# Patient Record
Sex: MALE | Race: BLACK OR AFRICAN AMERICAN | NOT HISPANIC OR LATINO | Employment: FULL TIME | URBAN - METROPOLITAN AREA
[De-identification: names, ages, dates, MRNs, and addresses within clinical notes are randomized per-mention and may not be internally consistent; named-entity substitution may affect disease eponyms.]

---

## 2023-04-11 PROBLEM — M79.671 FOOT PAIN, RIGHT: Status: ACTIVE | Noted: 2023-04-11

## 2023-04-11 PROBLEM — F41.9 ANXIETY DISORDER: Status: ACTIVE | Noted: 2023-04-11

## 2023-04-11 PROBLEM — E78.5 DYSLIPIDEMIA: Status: ACTIVE | Noted: 2023-04-11

## 2023-04-11 PROBLEM — E66.01 SEVERE OBESITY (BMI 35.0-39.9) WITH COMORBIDITY (MULTI): Status: ACTIVE | Noted: 2023-04-11

## 2023-04-11 PROBLEM — R43.0 ANOSMIA: Status: ACTIVE | Noted: 2023-04-11

## 2023-04-11 PROBLEM — Z98.52 HISTORY OF VASECTOMY: Status: ACTIVE | Noted: 2023-04-11

## 2023-04-11 PROBLEM — R50.9 FEVER AND CHILLS: Status: ACTIVE | Noted: 2023-04-11

## 2023-04-11 PROBLEM — K64.9 HEMORRHOIDS: Status: ACTIVE | Noted: 2023-04-11

## 2023-04-11 PROBLEM — G56.03 BILATERAL CARPAL TUNNEL SYNDROME: Status: ACTIVE | Noted: 2023-04-11

## 2023-04-11 PROBLEM — J45.909 ASTHMA (HHS-HCC): Status: ACTIVE | Noted: 2023-04-11

## 2023-04-11 PROBLEM — M79.10 MYALGIA: Status: ACTIVE | Noted: 2023-04-11

## 2023-04-11 PROBLEM — R42 DIZZINESS: Status: ACTIVE | Noted: 2023-04-11

## 2023-04-11 PROBLEM — R25.2 MUSCLE CRAMP: Status: ACTIVE | Noted: 2023-04-11

## 2023-04-11 PROBLEM — E66.9 OBESITY: Status: ACTIVE | Noted: 2023-04-11

## 2023-04-11 PROBLEM — I10 HYPERTENSION: Status: ACTIVE | Noted: 2023-04-11

## 2023-04-11 PROBLEM — M79.646 THUMB PAIN: Status: ACTIVE | Noted: 2023-04-11

## 2023-04-11 PROBLEM — R63.5 WEIGHT GAIN: Status: ACTIVE | Noted: 2023-04-11

## 2023-04-11 PROBLEM — E11.9 DIABETES MELLITUS (MULTI): Status: ACTIVE | Noted: 2023-04-11

## 2023-04-11 PROBLEM — J11.1 INFLUENZA: Status: ACTIVE | Noted: 2023-04-11

## 2023-04-11 PROBLEM — N52.9 DIFFICULTY ATTAINING ERECTION: Status: ACTIVE | Noted: 2023-04-11

## 2023-04-11 PROBLEM — G43.109 CLASSIC MIGRAINE WITH AURA: Status: ACTIVE | Noted: 2023-04-11

## 2023-04-11 PROBLEM — H43.399 FLOATERS: Status: ACTIVE | Noted: 2023-04-11

## 2023-04-11 PROBLEM — A60.00 HERPES GENITALIA: Status: ACTIVE | Noted: 2023-04-11

## 2023-04-11 PROBLEM — M25.511 SHOULDER PAIN, BILATERAL: Status: ACTIVE | Noted: 2023-04-11

## 2023-04-11 PROBLEM — M25.532 LEFT WRIST PAIN: Status: ACTIVE | Noted: 2023-04-11

## 2023-04-11 PROBLEM — M25.539 WRIST PAIN: Status: ACTIVE | Noted: 2023-04-11

## 2023-04-11 PROBLEM — R06.83 SNORING: Status: ACTIVE | Noted: 2023-04-11

## 2023-04-11 PROBLEM — M25.512 SHOULDER PAIN, BILATERAL: Status: ACTIVE | Noted: 2023-04-11

## 2023-04-11 PROBLEM — J30.9 ALLERGIC RHINITIS: Status: ACTIVE | Noted: 2023-04-11

## 2023-04-11 PROBLEM — E55.9 VITAMIN D DEFICIENCY: Status: ACTIVE | Noted: 2023-04-11

## 2023-04-11 PROBLEM — M54.12 CERVICAL RADICULOPATHY: Status: ACTIVE | Noted: 2023-04-11

## 2023-04-11 RX ORDER — ALBUTEROL SULFATE 90 UG/1
2 AEROSOL, METERED RESPIRATORY (INHALATION) EVERY 4 HOURS PRN
COMMUNITY

## 2023-04-11 RX ORDER — SERTRALINE HYDROCHLORIDE 50 MG/1
1 TABLET, FILM COATED ORAL DAILY
COMMUNITY

## 2023-04-11 RX ORDER — ASPIRIN 325 MG
1 TABLET, DELAYED RELEASE (ENTERIC COATED) ORAL
COMMUNITY

## 2023-04-11 RX ORDER — PREDNISONE 10 MG/1
3 TABLET ORAL DAILY
COMMUNITY

## 2023-04-11 RX ORDER — METFORMIN HYDROCHLORIDE 500 MG/1
2 TABLET, EXTENDED RELEASE ORAL 2 TIMES DAILY
COMMUNITY
End: 2023-04-14 | Stop reason: SDUPTHER

## 2023-04-11 RX ORDER — BLOOD-GLUCOSE METER
KIT MISCELLANEOUS DAILY
COMMUNITY

## 2023-04-14 ENCOUNTER — OFFICE VISIT (OUTPATIENT)
Dept: PRIMARY CARE | Facility: CLINIC | Age: 38
End: 2023-04-14
Payer: COMMERCIAL

## 2023-04-14 ENCOUNTER — LAB (OUTPATIENT)
Dept: LAB | Facility: LAB | Age: 38
End: 2023-04-14
Payer: COMMERCIAL

## 2023-04-14 VITALS
SYSTOLIC BLOOD PRESSURE: 120 MMHG | DIASTOLIC BLOOD PRESSURE: 80 MMHG | HEART RATE: 70 BPM | HEIGHT: 70 IN | OXYGEN SATURATION: 97 % | BODY MASS INDEX: 36.16 KG/M2 | WEIGHT: 252.6 LBS

## 2023-04-14 DIAGNOSIS — R74.01 ELEVATED TRANSAMINASE LEVEL: Primary | ICD-10-CM

## 2023-04-14 DIAGNOSIS — E78.5 HYPERLIPIDEMIA, UNSPECIFIED HYPERLIPIDEMIA TYPE: ICD-10-CM

## 2023-04-14 DIAGNOSIS — K29.70 GASTRITIS WITHOUT BLEEDING, UNSPECIFIED CHRONICITY, UNSPECIFIED GASTRITIS TYPE: ICD-10-CM

## 2023-04-14 DIAGNOSIS — I10 HYPERTENSION, UNSPECIFIED TYPE: ICD-10-CM

## 2023-04-14 DIAGNOSIS — E66.01 SEVERE OBESITY (BMI 35.0-39.9) WITH COMORBIDITY (MULTI): ICD-10-CM

## 2023-04-14 DIAGNOSIS — E11.9 TYPE 2 DIABETES MELLITUS WITHOUT COMPLICATION, WITHOUT LONG-TERM CURRENT USE OF INSULIN (MULTI): ICD-10-CM

## 2023-04-14 DIAGNOSIS — E11.9 TYPE 2 DIABETES MELLITUS WITHOUT COMPLICATION, WITHOUT LONG-TERM CURRENT USE OF INSULIN (MULTI): Primary | ICD-10-CM

## 2023-04-14 DIAGNOSIS — E66.01 CLASS 2 SEVERE OBESITY DUE TO EXCESS CALORIES WITH SERIOUS COMORBIDITY AND BODY MASS INDEX (BMI) OF 35.0 TO 35.9 IN ADULT (MULTI): ICD-10-CM

## 2023-04-14 LAB
ESTIMATED AVERAGE GLUCOSE FOR HBA1C: 249 MG/DL
HEMOGLOBIN A1C/HEMOGLOBIN TOTAL IN BLOOD: 10.3 %

## 2023-04-14 PROCEDURE — 83013 H PYLORI (C-13) BREATH: CPT

## 2023-04-14 PROCEDURE — 3079F DIAST BP 80-89 MM HG: CPT | Performed by: INTERNAL MEDICINE

## 2023-04-14 PROCEDURE — 83036 HEMOGLOBIN GLYCOSYLATED A1C: CPT

## 2023-04-14 PROCEDURE — 80061 LIPID PANEL: CPT

## 2023-04-14 PROCEDURE — 1036F TOBACCO NON-USER: CPT | Performed by: INTERNAL MEDICINE

## 2023-04-14 PROCEDURE — 82570 ASSAY OF URINE CREATININE: CPT

## 2023-04-14 PROCEDURE — 3074F SYST BP LT 130 MM HG: CPT | Performed by: INTERNAL MEDICINE

## 2023-04-14 PROCEDURE — 4010F ACE/ARB THERAPY RXD/TAKEN: CPT | Performed by: INTERNAL MEDICINE

## 2023-04-14 PROCEDURE — 82043 UR ALBUMIN QUANTITATIVE: CPT

## 2023-04-14 PROCEDURE — 3008F BODY MASS INDEX DOCD: CPT | Performed by: INTERNAL MEDICINE

## 2023-04-14 PROCEDURE — 99214 OFFICE O/P EST MOD 30 MIN: CPT | Performed by: INTERNAL MEDICINE

## 2023-04-14 PROCEDURE — 80053 COMPREHEN METABOLIC PANEL: CPT

## 2023-04-14 PROCEDURE — 36415 COLL VENOUS BLD VENIPUNCTURE: CPT

## 2023-04-14 RX ORDER — OMEPRAZOLE 20 MG/1
20 TABLET, DELAYED RELEASE ORAL DAILY
Qty: 30 TABLET | Refills: 1 | COMMUNITY
Start: 2023-04-14 | End: 2023-04-17 | Stop reason: SDUPTHER

## 2023-04-14 RX ORDER — LISINOPRIL 5 MG/1
5 TABLET ORAL DAILY
Qty: 90 TABLET | Refills: 2 | Status: SHIPPED | OUTPATIENT
Start: 2023-04-14 | End: 2024-01-16

## 2023-04-14 RX ORDER — METFORMIN HYDROCHLORIDE 500 MG/1
1000 TABLET, EXTENDED RELEASE ORAL 2 TIMES DAILY
Qty: 360 TABLET | Refills: 2 | Status: SHIPPED | OUTPATIENT
Start: 2023-04-14 | End: 2024-01-16

## 2023-04-14 ASSESSMENT — ENCOUNTER SYMPTOMS
FATIGUE: 0
POLYPHAGIA: 0
WEIGHT LOSS: 0
ABDOMINAL PAIN: 1
VISUAL CHANGE: 0
DIARRHEA: 0
BLURRED VISION: 0
POLYDIPSIA: 0
ABDOMINAL DISTENTION: 1
BLOOD IN STOOL: 0
NAUSEA: 1
WEAKNESS: 0
CONSTIPATION: 0
VOMITING: 0

## 2023-04-14 ASSESSMENT — PATIENT HEALTH QUESTIONNAIRE - PHQ9
1. LITTLE INTEREST OR PLEASURE IN DOING THINGS: NOT AT ALL
2. FEELING DOWN, DEPRESSED OR HOPELESS: NOT AT ALL
SUM OF ALL RESPONSES TO PHQ9 QUESTIONS 1 AND 2: 0

## 2023-04-14 NOTE — PROGRESS NOTES
"Patient ID: Chas Coello is a 37 y.o. male who presents for Annual Exam, Med Refill, and GI Problem.    /80   Pulse 70   Ht 1.784 m (5' 10.25\")   Wt 115 kg (252 lb 9.6 oz)   SpO2 97%   BMI 35.99 kg/m²     Med Refill  Associated symptoms include abdominal pain and nausea. Pertinent negatives include no chest pain, fatigue, visual change, vomiting or weakness.   GI Problem  The primary symptoms include abdominal pain and nausea. Primary symptoms do not include weight loss, fatigue, vomiting or diarrhea.   The illness does not include constipation.   Diabetes  He presents for his initial diabetic visit. He has type 2 diabetes mellitus. No MedicAlert identification noted. Onset time: few yrs. There are no hypoglycemic associated symptoms. Pertinent negatives for diabetes include no blurred vision, no chest pain, no fatigue, no foot paresthesias, no foot ulcerations, no polydipsia, no polyphagia, no polyuria, no visual change, no weakness and no weight loss. There are no hypoglycemic complications. Symptoms are stable. There are no diabetic complications. Risk factors for coronary artery disease include male sex, stress and obesity. Current diabetic treatment includes oral agent (monotherapy). He is compliant with treatment some of the time. His weight is increasing steadily. He is following a generally unhealthy diet. When asked about meal planning, he reported none. He has not had a previous visit with a dietitian. He never participates in exercise. An ACE inhibitor/angiotensin II receptor blocker is not being taken. He does not see a podiatrist.Eye exam is current.     His other significant symptoms which she is here for  For the last few weeks or maybe a month or so he is having bloated feeling mid upper abdomen  He is not constipated he often times get little bit of heartburn  For that he takes Tums which helps temporarily  He does not have any blood in the stool  He does not have any problem with " swallowing  He drinks beer he takes significant amount of caffeine      Patient has hypertension not on lisinopril  Or any other diuretics  He does not have any chest pain, no shortness of breath,  No PND, no orthopnea no leg swelling no headache no palpitation      He is morbidly obese  He does not have any apnea  He does not have any gasping or choking for air at night  No daytime somnolence  No a.m. fatigue  No a.m. headache  Sleep reasonably well    Subjective     Review of Systems   Constitutional:  Negative for fatigue and weight loss.   Eyes:  Negative for blurred vision.   Cardiovascular:  Negative for chest pain.   Gastrointestinal:  Positive for abdominal distention, abdominal pain and nausea. Negative for blood in stool, constipation, diarrhea and vomiting.   Endocrine: Negative for polydipsia, polyphagia and polyuria.   Neurological:  Negative for weakness.       Objective     Physical Exam  Vitals and nursing note reviewed.   Constitutional:       Appearance: Normal appearance. He is obese.   Neck:      Vascular: No carotid bruit.   Cardiovascular:      Rate and Rhythm: Normal rate and regular rhythm.      Pulses: Normal pulses.      Heart sounds: Normal heart sounds.   Pulmonary:      Effort: Pulmonary effort is normal.      Breath sounds: Normal breath sounds.   Abdominal:      General: There is no distension.      Palpations: There is no mass.      Tenderness: There is abdominal tenderness. There is guarding. There is no rebound.      Hernia: No hernia is present.      Comments: Epigastric tenderness on deep palpation   Neurological:      Mental Status: He is alert.         Lab Results   Component Value Date    WBC 6.9 06/17/2022    HGB 14.7 06/17/2022    HCT 43.7 06/17/2022    MCV 80 06/17/2022     06/17/2022           Problem List Items Addressed This Visit          Circulatory    Hypertension    Relevant Medications    lisinopril 5 mg tablet       Endocrine/Metabolic    Diabetes mellitus  (CMS/[facility])    Relevant Medications    metFORMIN XR (Glucophage-XR) 500 mg 24 hr tablet    lisinopril 5 mg tablet    Other Relevant Orders    Comprehensive metabolic panel    Hemoglobin A1c    Albumin , Urine Random    Severe obesity (BMI 35.0-39.9) with comorbidity (CMS/[facility]) - Primary     Other Visit Diagnoses       Hyperlipidemia, unspecified hyperlipidemia type        Relevant Orders    Lipid panel    Gastritis without bleeding, unspecified chronicity, unspecified gastritis type        Relevant Medications    omeprazole OTC (PriLOSEC OTC) 20 mg EC tablet    Other Relevant Orders    H. Pylori Breath Test                 A/P       Discussed with the patient at length  That he is most likely having hyperacidity/gastritis  From using NSAIDs/too much of caffeine/stress/dietary indiscretion  I told him he needs to cut back caffeine  He needs to cut back NSAIDs use  Omeprazole 20 mg 30 minutes before dinner every night  Helicobacter pylori breath test  CMP, lipid, A1c, urine microalbumin  I refilled his metformin extended release 500 mg 2 pills twice a day 90-day supply 2 refills  I also started him on lisinopril 5 mg 1 pill every day 90-day supplies 2 refills  Discussed the adverse effect of morbid obesity on physical and mental wellbeing  Which includes sleep apnea, stroke, fatal arrhythmias  It also has a significant effect on hypertension and diabetes and he is being a diabetic is going to be an added problem  Also morbid obesity is an overall risk for overall morbidity and mortality for his health  It can be risk factor for cancer it can be risk factor for low back pain knee pain hip pain etc.  That is why I discussed the importance of losing weight eat healthy  I will see him in a month we will let him know about the results of those tests when it will be available

## 2023-04-15 LAB
ALANINE AMINOTRANSFERASE (SGPT) (U/L) IN SER/PLAS: 91 U/L (ref 10–52)
ALBUMIN (G/DL) IN SER/PLAS: 4.2 G/DL (ref 3.4–5)
ALBUMIN (MG/L) IN URINE: 38.3 MG/L
ALBUMIN/CREATININE (UG/MG) IN URINE: 9.6 UG/MG CRT (ref 0–30)
ALKALINE PHOSPHATASE (U/L) IN SER/PLAS: 83 U/L (ref 33–120)
ANION GAP IN SER/PLAS: 14 MMOL/L (ref 10–20)
ASPARTATE AMINOTRANSFERASE (SGOT) (U/L) IN SER/PLAS: 29 U/L (ref 9–39)
BILIRUBIN TOTAL (MG/DL) IN SER/PLAS: 1 MG/DL (ref 0–1.2)
CALCIUM (MG/DL) IN SER/PLAS: 9.4 MG/DL (ref 8.6–10.6)
CARBON DIOXIDE, TOTAL (MMOL/L) IN SER/PLAS: 23 MMOL/L (ref 21–32)
CHLORIDE (MMOL/L) IN SER/PLAS: 105 MMOL/L (ref 98–107)
CHOLESTEROL (MG/DL) IN SER/PLAS: 151 MG/DL (ref 0–199)
CHOLESTEROL IN HDL (MG/DL) IN SER/PLAS: 33.7 MG/DL
CHOLESTEROL/HDL RATIO: 4.5
CREATININE (MG/DL) IN SER/PLAS: 1.19 MG/DL (ref 0.5–1.3)
CREATININE (MG/DL) IN URINE: 400 MG/DL (ref 20–370)
GFR MALE: 79 ML/MIN/1.73M2
GLUCOSE (MG/DL) IN SER/PLAS: 224 MG/DL (ref 74–99)
H. PYLORI UBIT: NEGATIVE
LDL: 105 MG/DL (ref 0–99)
POTASSIUM (MMOL/L) IN SER/PLAS: 4.3 MMOL/L (ref 3.5–5.3)
PROTEIN TOTAL: 6.7 G/DL (ref 6.4–8.2)
SODIUM (MMOL/L) IN SER/PLAS: 138 MMOL/L (ref 136–145)
TRIGLYCERIDE (MG/DL) IN SER/PLAS: 62 MG/DL (ref 0–149)
UREA NITROGEN (MG/DL) IN SER/PLAS: 10 MG/DL (ref 6–23)
VLDL: 12 MG/DL (ref 0–40)

## 2023-04-17 DIAGNOSIS — K29.70 GASTRITIS WITHOUT BLEEDING, UNSPECIFIED CHRONICITY, UNSPECIFIED GASTRITIS TYPE: ICD-10-CM

## 2023-04-17 RX ORDER — OMEPRAZOLE 20 MG/1
20 TABLET, DELAYED RELEASE ORAL 2 TIMES DAILY
Qty: 180 TABLET | Refills: 2 | Status: SHIPPED | OUTPATIENT
Start: 2023-04-17 | End: 2023-07-07

## 2023-04-17 NOTE — TELEPHONE ENCOUNTER
----- Message from Enio Soto MD sent at 4/16/2023  3:17 PM EDT -----  Diabetes is very much uncontrolled A1c is more than 10.7 which is concerning though he is on metformin 1000 mg a day I want him to be seen by the endocrinologist I placed a referral he should be getting a call from them if he is not getting a call from them then he can call and make an appointment he also noted to have elevated liver enzymes which again could be related to diabetes from fatty liver so we need to do an ultrasound for the liver and also I need to check to make sure he is not positive for hep C lab request sent radiology ultrasound request sent contact with the x-ray to schedule appointment and he needs to show up to the lab for hep C blood test

## 2023-04-21 ENCOUNTER — OFFICE VISIT (OUTPATIENT)
Dept: PRIMARY CARE | Facility: CLINIC | Age: 38
End: 2023-04-21
Payer: COMMERCIAL

## 2023-04-21 DIAGNOSIS — K76.0 FATTY LIVER: Primary | ICD-10-CM

## 2023-04-21 DIAGNOSIS — R74.01 ELEVATED TRANSAMINASE LEVEL: ICD-10-CM

## 2023-04-21 LAB
HEPATITIS B VIRUS CORE AB (PRESENCE) IN SER/PLAS BY IMM: NONREACTIVE
HEPATITIS B VIRUS SURFACE AB (MIU/ML) IN SERUM: 4.4 MIU/ML
HEPATITIS C VIRUS AB PRESENCE IN SERUM: NONREACTIVE

## 2023-04-21 PROCEDURE — 3046F HEMOGLOBIN A1C LEVEL >9.0%: CPT | Performed by: INTERNAL MEDICINE

## 2023-04-21 PROCEDURE — 99211 OFF/OP EST MAY X REQ PHY/QHP: CPT | Performed by: INTERNAL MEDICINE

## 2023-04-21 PROCEDURE — 36415 COLL VENOUS BLD VENIPUNCTURE: CPT | Performed by: INTERNAL MEDICINE

## 2023-04-21 PROCEDURE — 86803 HEPATITIS C AB TEST: CPT

## 2023-04-21 PROCEDURE — 86706 HEP B SURFACE ANTIBODY: CPT

## 2023-04-21 PROCEDURE — 1036F TOBACCO NON-USER: CPT | Performed by: INTERNAL MEDICINE

## 2023-04-21 PROCEDURE — 4010F ACE/ARB THERAPY RXD/TAKEN: CPT | Performed by: INTERNAL MEDICINE

## 2023-04-21 PROCEDURE — 86704 HEP B CORE ANTIBODY TOTAL: CPT

## 2023-04-21 PROCEDURE — 3008F BODY MASS INDEX DOCD: CPT | Performed by: INTERNAL MEDICINE

## 2023-04-28 ENCOUNTER — TELEPHONE (OUTPATIENT)
Dept: PRIMARY CARE | Facility: CLINIC | Age: 38
End: 2023-04-28
Payer: COMMERCIAL

## 2023-04-28 NOTE — TELEPHONE ENCOUNTER
----- Message from Enio Soto MD sent at 4/27/2023  8:04 AM EDT -----  HEP C AND HEP B  CORE AB NEGATIVE , SINCE HE IS NOT ADEQUATELY IMMUNIZED AGAINST HEP B SINCE  TITER IS LOW , NEED TO GET HEPSLAV  2 DOSES TO GET IMMUNIZED AGAINST HEP B

## 2023-04-28 NOTE — TELEPHONE ENCOUNTER
----- Message from Enio Soto MD sent at 4/27/2023  8:43 AM EDT -----  NEED TO CONTROL DIABETES TOO

## 2023-04-28 NOTE — TELEPHONE ENCOUNTER
----- Message from Enio Soto MD sent at 4/27/2023  8:41 AM EDT -----  HE ALREADY HAD LIVER ULTRASOUND SHOWS FATTY LIVER , HE DOES NOT NEED TO REPEAT THE TEST , HE NEEDS TO  LOOSE WEIGHT WHICH WILL HELP WITH FATTY LIVER

## 2023-04-28 NOTE — TELEPHONE ENCOUNTER
----- Message from Enio Soto MD sent at 4/27/2023  8:08 AM EDT -----  IT APPEARS SINCE HIS LIVER ENZYME IS ELEVATED IT IS MOSTLY FROM FATTY LIVER AND DIABETES , I ALSO RECOMMEND LIVER ULTRASOUND REQUEST SENT

## 2023-06-08 ENCOUNTER — TELEPHONE (OUTPATIENT)
Dept: PRIMARY CARE | Facility: CLINIC | Age: 38
End: 2023-06-08

## 2023-06-08 NOTE — TELEPHONE ENCOUNTER
Nona nurse  for [facility] just letting you know this is part of his benefits package and she will contact you for future information on patient

## 2023-07-07 ENCOUNTER — OFFICE VISIT (OUTPATIENT)
Dept: PRIMARY CARE | Facility: CLINIC | Age: 38
End: 2023-07-07
Payer: COMMERCIAL

## 2023-07-07 VITALS
HEART RATE: 60 BPM | DIASTOLIC BLOOD PRESSURE: 80 MMHG | WEIGHT: 248.6 LBS | BODY MASS INDEX: 35.42 KG/M2 | OXYGEN SATURATION: 99 % | SYSTOLIC BLOOD PRESSURE: 122 MMHG

## 2023-07-07 DIAGNOSIS — Z13.89 SCREENING FOR OBESITY: ICD-10-CM

## 2023-07-07 DIAGNOSIS — E11.69 TYPE 2 DIABETES MELLITUS WITH OTHER SPECIFIED COMPLICATION, WITHOUT LONG-TERM CURRENT USE OF INSULIN (MULTI): Primary | ICD-10-CM

## 2023-07-07 DIAGNOSIS — E66.01 SEVERE OBESITY (BMI 35.0-39.9) WITH COMORBIDITY (MULTI): ICD-10-CM

## 2023-07-07 DIAGNOSIS — E66.01 CLASS 2 SEVERE OBESITY DUE TO EXCESS CALORIES WITH SERIOUS COMORBIDITY AND BODY MASS INDEX (BMI) OF 35.0 TO 35.9 IN ADULT (MULTI): ICD-10-CM

## 2023-07-07 DIAGNOSIS — R74.01 ELEVATED TRANSAMINASE LEVEL: ICD-10-CM

## 2023-07-07 DIAGNOSIS — Z23 NEED FOR VACCINATION: ICD-10-CM

## 2023-07-07 LAB
ALANINE AMINOTRANSFERASE (SGPT) (U/L) IN SER/PLAS: 19 U/L (ref 10–52)
ALBUMIN (G/DL) IN SER/PLAS: 4.6 G/DL (ref 3.4–5)
ALKALINE PHOSPHATASE (U/L) IN SER/PLAS: 68 U/L (ref 33–120)
ASPARTATE AMINOTRANSFERASE (SGOT) (U/L) IN SER/PLAS: 17 U/L (ref 9–39)
BILIRUBIN DIRECT (MG/DL) IN SER/PLAS: 0.2 MG/DL (ref 0–0.3)
BILIRUBIN TOTAL (MG/DL) IN SER/PLAS: 1 MG/DL (ref 0–1.2)
ESTIMATED AVERAGE GLUCOSE FOR HBA1C: 174 MG/DL
HEMOGLOBIN A1C/HEMOGLOBIN TOTAL IN BLOOD: 7.7 %
PROTEIN TOTAL: 7.1 G/DL (ref 6.4–8.2)

## 2023-07-07 PROCEDURE — 3051F HG A1C>EQUAL 7.0%<8.0%: CPT | Performed by: INTERNAL MEDICINE

## 2023-07-07 PROCEDURE — 3008F BODY MASS INDEX DOCD: CPT | Performed by: INTERNAL MEDICINE

## 2023-07-07 PROCEDURE — 4010F ACE/ARB THERAPY RXD/TAKEN: CPT | Performed by: INTERNAL MEDICINE

## 2023-07-07 PROCEDURE — 1036F TOBACCO NON-USER: CPT | Performed by: INTERNAL MEDICINE

## 2023-07-07 PROCEDURE — 3079F DIAST BP 80-89 MM HG: CPT | Performed by: INTERNAL MEDICINE

## 2023-07-07 PROCEDURE — 90471 IMMUNIZATION ADMIN: CPT | Performed by: INTERNAL MEDICINE

## 2023-07-07 PROCEDURE — 90739 HEPB VACC 2/4 DOSE ADULT IM: CPT | Performed by: INTERNAL MEDICINE

## 2023-07-07 PROCEDURE — 99213 OFFICE O/P EST LOW 20 MIN: CPT | Performed by: INTERNAL MEDICINE

## 2023-07-07 PROCEDURE — 80076 HEPATIC FUNCTION PANEL: CPT

## 2023-07-07 PROCEDURE — 3074F SYST BP LT 130 MM HG: CPT | Performed by: INTERNAL MEDICINE

## 2023-07-07 PROCEDURE — 83036 HEMOGLOBIN GLYCOSYLATED A1C: CPT

## 2023-07-07 ASSESSMENT — ENCOUNTER SYMPTOMS
VISUAL CHANGE: 0
BLURRED VISION: 0
DIZZINESS: 0
CONSTITUTIONAL NEGATIVE: 1
DIABETIC ASSOCIATED SYMPTOMS: 0
NUMBNESS: 1

## 2023-07-07 ASSESSMENT — PATIENT HEALTH QUESTIONNAIRE - PHQ9
2. FEELING DOWN, DEPRESSED OR HOPELESS: NOT AT ALL
SUM OF ALL RESPONSES TO PHQ9 QUESTIONS 1 AND 2: 0
1. LITTLE INTEREST OR PLEASURE IN DOING THINGS: NOT AT ALL

## 2023-07-07 NOTE — PROGRESS NOTES
Patient ID: Chas Coello is a 38 y.o. male who presents for Follow-up.    /80   Pulse 60   Wt 113 kg (248 lb 9.6 oz)   SpO2 99%   BMI 35.42 kg/m²     Diabetes  He presents for his follow-up diabetic visit. He has type 2 diabetes mellitus. Onset time: FEW MONTHS. Pertinent negatives for hypoglycemia include no dizziness. There are no diabetic associated symptoms. Pertinent negatives for diabetes include no blurred vision, no foot paresthesias, no foot ulcerations and no visual change. There are no hypoglycemic complications. Symptoms are stable. Diabetic complications include nephropathy. Pertinent negatives for diabetic complications include no CVA, heart disease, peripheral neuropathy, PVD or retinopathy. Risk factors for coronary artery disease include male sex, diabetes mellitus and dyslipidemia. Current diabetic treatment includes oral agent (monotherapy). He is compliant with treatment all of the time. He is following a diabetic diet. He has had a previous visit with a dietitian. He participates in exercise intermittently. An ACE inhibitor/angiotensin II receptor blocker is being taken. He does not see a podiatrist.Eye exam is current.       Subjective     Review of Systems   Constitutional: Negative.    Eyes:  Negative for blurred vision.   Neurological:  Positive for numbness. Negative for dizziness.   All other systems reviewed and are negative.      Objective     Physical Exam  Vitals and nursing note reviewed.   Neck:      Vascular: No carotid bruit.   Cardiovascular:      Rate and Rhythm: Normal rate and regular rhythm.      Pulses: Normal pulses.      Heart sounds: Normal heart sounds. No murmur heard.  Musculoskeletal:      Right lower leg: No edema.      Left lower leg: No edema.   Lymphadenopathy:      Cervical: No cervical adenopathy.   Skin:     Capillary Refill: Capillary refill takes more than 3 seconds.   Neurological:      General: No focal deficit present.      Mental Status: He is  oriented to person, place, and time.   Psychiatric:         Mood and Affect: Mood normal.         Behavior: Behavior normal.         Thought Content: Thought content normal.         Judgment: Judgment normal.         Lab Results   Component Value Date    WBC 6.9 06/17/2022    HGB 14.7 06/17/2022    HCT 43.7 06/17/2022    MCV 80 06/17/2022     06/17/2022           Problem List Items Addressed This Visit       Diabetes mellitus (CMS/HCC) - Primary    Relevant Orders    Referral to Ophthalmology    Hemoglobin A1c    Referral to Nutrition Services    Obesity    Relevant Orders    Referral to Nutrition Services    Severe obesity (BMI 35.0-39.9) with comorbidity (CMS/HCC)    Screening for obesity     Other Visit Diagnoses       Elevated transaminase level        Relevant Orders    Hepatic function panel                A/P         I OFFERED HIM TO BE SEEN BY HEPATOLOGY , PT DEFFERED   I OFFERED HIM TO BE SEEN BY ENDOCRINOLOGY , HE DEFFERED   REFFERAL NUTRIOTION   REFFERAL OPHTHALMOLOGY   A1C TODAY , REPEAT LFT IN 2MONTHS TIME   WILL GET HEPLISAV IMMUNIZATION   Discussed the effect of morbid obesity on overall all cause mortality  Discussed the effect of morbid obesity and physical mental wellbeing  Discussed the effect of morbid obesity on cardiovascular and cerebrovascular health  Discussed the effect of morbid obesity and hypertension:DIABETES, sleep apnea fatal arrhythmia and sudden death  Discussed the effect of morbid obesity on cancer and malignancy  Discussed the effect of morbid obesity chronic low back pain hip pain knee pain  Discussed the effect of diabetes, that it can have a long-term implication in terms of cardiovascular and cerebrovascular health since he already had a fatty liver discussed the importance of losing weight and to see the nutritionist and to restrict calories intake and portion size I will see him in 3 months time

## 2023-07-12 ENCOUNTER — TELEPHONE (OUTPATIENT)
Dept: PRIMARY CARE | Facility: CLINIC | Age: 38
End: 2023-07-12
Payer: COMMERCIAL

## 2023-10-10 ENCOUNTER — NUTRITION (OUTPATIENT)
Dept: NUTRITION | Facility: HOSPITAL | Age: 38
End: 2023-10-10
Payer: COMMERCIAL

## 2023-10-10 DIAGNOSIS — E11.69 TYPE 2 DIABETES MELLITUS WITH OTHER SPECIFIED COMPLICATION, WITHOUT LONG-TERM CURRENT USE OF INSULIN (MULTI): Primary | ICD-10-CM

## 2023-10-10 DIAGNOSIS — E66.01 CLASS 2 SEVERE OBESITY DUE TO EXCESS CALORIES WITH SERIOUS COMORBIDITY AND BODY MASS INDEX (BMI) OF 35.0 TO 35.9 IN ADULT (MULTI): ICD-10-CM

## 2023-10-10 PROCEDURE — 97802 MEDICAL NUTRITION INDIV IN: CPT | Performed by: DIETITIAN, REGISTERED

## 2023-10-10 NOTE — PROGRESS NOTES
Reason for Nutrition Visit:  Pt is a 38 y.o. [place] [facility] [place] and [place].    Past Medical Hx:  Patient Active Problem List   Diagnosis    Allergic rhinitis    Anosmia    Anxiety disorder    Asthma    Bilateral carpal tunnel syndrome    Cervical radiculopathy    Classic migraine with aura    Diabetes mellitus (CMS/HCC)    Difficulty attaining erection    Dizziness    Dyslipidemia    Fever and chills    Floaters    Foot pain, right    Hemorrhoids    Herpes genitalia    Hypertension    Influenza    Left wrist pain    Muscle cramp    Myalgia    Obesity    Severe obesity (BMI 35.0-39.9) with comorbidity (CMS/HCC)    Shoulder pain, bilateral    Snoring    Thumb pain    Vitamin D deficiency    Weight gain    Wrist pain    History of vasectomy    Screening for obesity      Lab Results   Component Value Date    HGBA1C 7.7 (A) 07/07/2023    CHOL 151 04/14/2023    LDLF 105 (H) 04/14/2023    TRIG 62 04/14/2023    HDL 33.7 (A) 04/14/2023      Food and Nutrition Hx:  Pt is here to discuss T2DM. He was diagnosed with DM about 10 yrs ago and only takes metformin. He tells his HgA1c went up to 10% about 5 months ago, but he got back to taking his medications consistently and it's now down to 7.7%. He does get nausea from taking metformin which sanford him from taking it sometimes. He tells that he eats about 1-2 meals per day because of the nausea makes his appetite goes low. He works as a  4 days per week from [place] to [place] and often gets food on his trips. He recently started a second job too that is more sedentary. Previously he was trying to walk more over the summer, but this has stopped since starting the second job. He does snack more often in his trips. His wife likes to make healthy meals and typically includes more vegetables at dinner.    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    Allergies: Shellfish and sometimes raw fruits/vegetables cause asthma symptoms  Intolerance: Lactose and slightly intolerant to milk  Appetite: Fluctuates  Intake: >75%  GI Symptoms : nausea, reflux, and bloating Frequency: frequent  Swallowing Difficulty: No problems with swallowing  Dentition : own    Types of Activities: Mostly Sedentary    Sleep duration/quality : 5-6 hours and disrupted sleep  Sleep disorders: none    Supplements: Multivitamin irregularly    Feelings of Hunger?: Yes and will eat  Physical Feeling: Physically full and Stuffed  Binging: Never  Cravings: Sweet and Starchy  Energy Levels: Low    Food Preparation: Partner/Spouse  Cooking Skills/Barriers: None reported  Grocery Shopping: Patient    Eating Out Type: Fast Food, Restaurant, and Take Out  2-3 times per week    Nutrition Focused Physical Exam:    Performed/Deferred: Deferred as pt visually appears well-nourished with no signs of malnutrition    Muscle Wasting:  Temporal: None  Shoulder: None  None  Interosseous Muscle: None  Quadriceps: None    Loss of Subcutaneous Fat:  Eyes: None  Perioral: None  Triceps: None  Chest: None    Other Physical Findings:  Hair: None  None  Mouth: None  Skin: None  Nails: None  none    Malnutrition Present: No    Estimated Energy Needs:    Weight Maintanence: 2460 kcal/day, MSJ=2050x1.2, 135 g/pro/day, and 1.2 g/pro/kg/day  Weight Loss Needs: 2160 kcal/day, MSJ: 2460-300, 113 g/pro/day, and 1 g/pro/kg/day    Nutrition Diagnosis:    Diagnosis Statement 1:  Diagnosis Status: New  Diagnosis : Altered nutrition related lab values  related to  endocrine dysfunction  as evidenced by  HgA1c of  7.7% on 07/07/2023    Diagnosis Statement 2:  Diagnosis Status: New  Diagnosis : Food and nutrition related knowledge deficit related to lack of or limited prior nutrition-related education as evidenced by no prior knowledge of need for food- and nutrition-related recommendations    Diagnosis Statement 3:   Diagnosis Status: New  Diagnosis : Inadequate fiber intake  related to food and nutrition related knowledge deficit concerning desirable quantities of fiber as evidenced by estimated intake of fiber that is insufficient when compared to recommended amounts (38 g/day for men and 25 g/day for women)    Nutrition Interventions:  Decreased Carbohydrate Diet, Decreased Sodium Diet, Increased Fiber Diet, Increased Soluble Fiber, Increased Omega-3 Diet, Increased Protein Diet, Low Saturated Fat Diet, and Mediterranean Diet  Nutrition Counseling: Motivational Interviewing and Goal Setting  Coordination of Care: None    Nutrition Goals:  Nutrition Goals : Adequate fluid intake: 64 oz+  Consume prescribed supplements  Decrease intake of added sugars  Decrease intake of saturated fats  Decrease sodium intake  Increase awareness and respond to hunger cues  Increase awareness and respond to satiety cues  Reduce Hgb A1c  Reduce Kcal Intake  Weight Loss    Nutrition Recommendations:  Via teach back method patient verbalized understanding of the following topics:  1) Make a plate that is 1/2 filled with non-starchy vegetables (any vegetable other than potatoes, peas or corn), 1/4 filled with whole grain/starch and 1/4 lean protein. This will help increase fiber intake and keep you full after eating.  2) Discussed the importance of maintaining meal/snack timing to help regulate appetite and portion sizes. Try to limit intervals in-between meals no longer than 3-5 hours apart.  3) Find quick high protein and fiber breakfasts you can eat before you start work. Consider making protein shakes, greek yogurt bowl with nuts, seeds or  fruit or overnight oats.  4) Reviewed label reading for added sugars. Look for food items with less than 20% of daily value from added sugars or as close to 0%.  5) Talked about easy to prep lunch ideas such as tuna fish, yuly boxes, layered salads in jar or mini egg muffins.  6) Consider using a soluble fiber supplement such as Metamucil. It can help with reduce appetite, lower cholesterol and blood sugar control. Start taking 1 tsp in 8 oz of water once per day and increase to 2-3x per day overtime.    Educational Handouts: ADA Placemat, How to Use a Soluble Fiber Supplement, High Protein Snack Ideas, and Med Tuna fish, mini egg muffins, overnight oats, freestyle so, greek salad in a jar, burrito bowl, ACLM snacks, chocolate sahil seed pudding, and blueberry granola cups    Readiness to Change : Good  Level of Understanding : Good  Anticipated Compliant : Good

## 2023-10-10 NOTE — PATIENT INSTRUCTIONS
1) Consider following a Mediterranean Diet approach to help improve health and reduce risk/manage chronic disease. Focus on eating more unprocessed foods including fruits, vegetables, whole grains, legumes, nuts, fish, poultry, eggs, low fat cheese and dairy (cottage cheese, greek yogurt, and kefir). Include variety and color in your diet with fresh or frozen varieties of fruits and vegetables regularly included at your meals.  2) Decrease intake of processed foods with added fats, sugar, and salt. Reduce salt or sodium intake to less than 1500mg of sodium per day. Instead of adding table salt for flavor, use more herbs (basil, thyme, mint, parsley, cilantro, and dill), seasonings (cinnamon, cloves, oregano, fennel seed, and chili powder) and other flavors (dinah, garlic, and turmeric) when cooking. Use vegetable oil such as olive, canola, safflower, soybean and corn instead of butter, lard, or whole-fat dairy sources when cooking.  3) Eat less red meat including lean beef, pork or lamb to only a few times per month. Lean poultry including skinless chicken and turkey can be eaten a few times per week with a serving being 2-3 ounces. Focus on plant based protein sources such as nuts (almonds, walnuts, and pistachios), seeds (sunflower, flax, sahil), legumes (navy beans, kidney beans, black beans, chickpeas, and lentils), tofu, tempeh, and soy products.  4) Eat more fresh or canned fish including shellfish, salmon, tuna, sardines and herring several times a week. These contain beneficial omega-3 fatty acids and have a low saturated fat content.  5) Choose more whole grains for their added fiber content. Whole grains include 100% whole wheat bread products, brown rice, popcorn, oatmeal, quinoa, and couscous. Try to eat fruit for something sweet such as fruit parfait made with unsweetened greek yogurt and homemade granola or low sugar fruit desserts.  6) Include physical activity into your day with a goal to meet 150  minutes of moderate-intensity aerobic activity (walking, biking, swimming, or housework) every week. Strength-training or weight-bearing exercise should be included twice a week if you are physically able to.

## 2024-01-14 DIAGNOSIS — I10 HYPERTENSION, UNSPECIFIED TYPE: ICD-10-CM

## 2024-01-14 DIAGNOSIS — E11.9 TYPE 2 DIABETES MELLITUS WITHOUT COMPLICATION, WITHOUT LONG-TERM CURRENT USE OF INSULIN (MULTI): ICD-10-CM

## 2024-01-16 RX ORDER — METFORMIN HYDROCHLORIDE 500 MG/1
1000 TABLET, EXTENDED RELEASE ORAL 2 TIMES DAILY
Qty: 120 TABLET | Refills: 1 | Status: SHIPPED | OUTPATIENT
Start: 2024-01-16 | End: 2024-02-19

## 2024-01-16 RX ORDER — LISINOPRIL 5 MG/1
5 TABLET ORAL DAILY
Qty: 30 TABLET | Refills: 1 | Status: SHIPPED | OUTPATIENT
Start: 2024-01-16 | End: 2024-01-21

## 2024-02-10 DIAGNOSIS — E11.9 TYPE 2 DIABETES MELLITUS WITHOUT COMPLICATION, WITHOUT LONG-TERM CURRENT USE OF INSULIN (MULTI): ICD-10-CM

## 2024-02-19 RX ORDER — METFORMIN HYDROCHLORIDE 500 MG/1
TABLET, EXTENDED RELEASE ORAL
Qty: 120 TABLET | Refills: 0 | Status: SHIPPED | OUTPATIENT
Start: 2024-02-19 | End: 2024-03-07

## 2024-04-26 DIAGNOSIS — E11.9 TYPE 2 DIABETES MELLITUS WITHOUT COMPLICATION, WITHOUT LONG-TERM CURRENT USE OF INSULIN (MULTI): ICD-10-CM

## 2024-04-28 RX ORDER — METFORMIN HYDROCHLORIDE 500 MG/1
TABLET, EXTENDED RELEASE ORAL
Qty: 28 TABLET | Refills: 0 | OUTPATIENT
Start: 2024-04-28

## 2024-05-10 ENCOUNTER — OFFICE VISIT (OUTPATIENT)
Dept: PRIMARY CARE | Facility: CLINIC | Age: 39
End: 2024-05-10
Payer: COMMERCIAL

## 2024-05-10 ENCOUNTER — LAB (OUTPATIENT)
Dept: LAB | Facility: LAB | Age: 39
End: 2024-05-10
Payer: COMMERCIAL

## 2024-05-10 VITALS
WEIGHT: 249.4 LBS | BODY MASS INDEX: 35.53 KG/M2 | SYSTOLIC BLOOD PRESSURE: 124 MMHG | DIASTOLIC BLOOD PRESSURE: 70 MMHG | OXYGEN SATURATION: 99 % | HEART RATE: 71 BPM

## 2024-05-10 DIAGNOSIS — E11.69 TYPE 2 DIABETES MELLITUS WITH OTHER SPECIFIED COMPLICATION, WITHOUT LONG-TERM CURRENT USE OF INSULIN (MULTI): Primary | ICD-10-CM

## 2024-05-10 DIAGNOSIS — E11.9 TYPE 2 DIABETES MELLITUS WITHOUT COMPLICATION, WITHOUT LONG-TERM CURRENT USE OF INSULIN (MULTI): ICD-10-CM

## 2024-05-10 DIAGNOSIS — I10 HYPERTENSION, UNSPECIFIED TYPE: ICD-10-CM

## 2024-05-10 DIAGNOSIS — E78.5 DYSLIPIDEMIA: ICD-10-CM

## 2024-05-10 DIAGNOSIS — E11.69 TYPE 2 DIABETES MELLITUS WITH OTHER SPECIFIED COMPLICATION (MULTI): Primary | ICD-10-CM

## 2024-05-10 DIAGNOSIS — E11.69 TYPE 2 DIABETES MELLITUS WITH OTHER SPECIFIED COMPLICATION, WITHOUT LONG-TERM CURRENT USE OF INSULIN (MULTI): ICD-10-CM

## 2024-05-10 DIAGNOSIS — J45.20 MILD INTERMITTENT ASTHMA, UNSPECIFIED WHETHER COMPLICATED (HHS-HCC): ICD-10-CM

## 2024-05-10 DIAGNOSIS — F41.1 GENERALIZED ANXIETY DISORDER: ICD-10-CM

## 2024-05-10 LAB
ALBUMIN SERPL BCP-MCNC: 4.3 G/DL (ref 3.4–5)
ALP SERPL-CCNC: 118 U/L (ref 33–120)
ALT SERPL W P-5'-P-CCNC: 18 U/L (ref 10–52)
ANION GAP SERPL CALC-SCNC: 13 MMOL/L (ref 10–20)
AST SERPL W P-5'-P-CCNC: 15 U/L (ref 9–39)
BILIRUB SERPL-MCNC: 0.7 MG/DL (ref 0–1.2)
BUN SERPL-MCNC: 12 MG/DL (ref 6–23)
CALCIUM SERPL-MCNC: 9.6 MG/DL (ref 8.6–10.6)
CHLORIDE SERPL-SCNC: 102 MMOL/L (ref 98–107)
CHOLEST SERPL-MCNC: 202 MG/DL (ref 0–199)
CHOLESTEROL/HDL RATIO: 5.3
CO2 SERPL-SCNC: 25 MMOL/L (ref 21–32)
CREAT SERPL-MCNC: 1.14 MG/DL (ref 0.5–1.3)
CREAT UR-MCNC: 72 MG/DL (ref 20–370)
EGFRCR SERPLBLD CKD-EPI 2021: 83 ML/MIN/1.73M*2
EST. AVERAGE GLUCOSE BLD GHB EST-MCNC: 217 MG/DL
GLUCOSE SERPL-MCNC: 223 MG/DL (ref 74–99)
HBA1C MFR BLD: 9.2 %
HDLC SERPL-MCNC: 38 MG/DL
LDLC SERPL CALC-MCNC: 149 MG/DL
MICROALBUMIN UR-MCNC: <7 MG/L
MICROALBUMIN/CREAT UR: NORMAL MG/G{CREAT}
NON HDL CHOLESTEROL: 164 MG/DL (ref 0–149)
POTASSIUM SERPL-SCNC: 4.2 MMOL/L (ref 3.5–5.3)
PROT SERPL-MCNC: 7.1 G/DL (ref 6.4–8.2)
SODIUM SERPL-SCNC: 136 MMOL/L (ref 136–145)
TRIGL SERPL-MCNC: 77 MG/DL (ref 0–149)
VLDL: 15 MG/DL (ref 0–40)

## 2024-05-10 PROCEDURE — 99213 OFFICE O/P EST LOW 20 MIN: CPT | Performed by: INTERNAL MEDICINE

## 2024-05-10 PROCEDURE — 1036F TOBACCO NON-USER: CPT | Performed by: INTERNAL MEDICINE

## 2024-05-10 PROCEDURE — 4010F ACE/ARB THERAPY RXD/TAKEN: CPT | Performed by: INTERNAL MEDICINE

## 2024-05-10 PROCEDURE — 36415 COLL VENOUS BLD VENIPUNCTURE: CPT

## 2024-05-10 PROCEDURE — 3074F SYST BP LT 130 MM HG: CPT | Performed by: INTERNAL MEDICINE

## 2024-05-10 PROCEDURE — 82043 UR ALBUMIN QUANTITATIVE: CPT

## 2024-05-10 PROCEDURE — 80061 LIPID PANEL: CPT

## 2024-05-10 PROCEDURE — 3078F DIAST BP <80 MM HG: CPT | Performed by: INTERNAL MEDICINE

## 2024-05-10 PROCEDURE — 83036 HEMOGLOBIN GLYCOSYLATED A1C: CPT

## 2024-05-10 PROCEDURE — 82570 ASSAY OF URINE CREATININE: CPT

## 2024-05-10 PROCEDURE — 80053 COMPREHEN METABOLIC PANEL: CPT

## 2024-05-10 RX ORDER — ATORVASTATIN CALCIUM 10 MG/1
10 TABLET, FILM COATED ORAL DAILY
Qty: 100 TABLET | Refills: 3 | Status: SHIPPED | OUTPATIENT
Start: 2024-05-10 | End: 2025-06-14

## 2024-05-10 RX ORDER — PIOGLITAZONEHYDROCHLORIDE 15 MG/1
15 TABLET ORAL DAILY
Qty: 90 TABLET | Refills: 3 | Status: SHIPPED | OUTPATIENT
Start: 2024-05-10 | End: 2025-05-10

## 2024-05-10 RX ORDER — LISINOPRIL 5 MG/1
5 TABLET ORAL DAILY
Qty: 90 TABLET | Refills: 1 | Status: SHIPPED | OUTPATIENT
Start: 2024-05-10

## 2024-05-10 RX ORDER — METFORMIN HYDROCHLORIDE 500 MG/1
TABLET, EXTENDED RELEASE ORAL
Qty: 180 TABLET | Refills: 3 | Status: SHIPPED | OUTPATIENT
Start: 2024-05-10

## 2024-05-10 ASSESSMENT — ENCOUNTER SYMPTOMS: CONSTITUTIONAL NEGATIVE: 1

## 2024-05-10 NOTE — PROGRESS NOTES
Patient ID: Chas Coello is a 38 y.o. male who presents for Med Refill.    /70   Pulse 71   Wt 113 kg (249 lb 6.4 oz)   SpO2 99%   BMI 35.53 kg/m²     Med Refill            HTN ON MEDICATIONS CONTROLLED   NO CP, NO SOB , NO PND , NO ORTHOPNEA  NO LEG SWELLING , NO HEADACHE , NO PALPITATION       DIABETES 2 CONTROLLED   ON METFORMIN 1000MG   PO BID , PT HAVING NAUSEA   FROM METFORMIN       GENERALIZED ANXIETY DISORDER   ON SERTRALINE  50MG 1 PILL EVERY DAY STABLE       Subjective     Review of Systems   Constitutional: Negative.    All other systems reviewed and are negative.      Objective     Physical Exam  Vitals and nursing note reviewed.   Neck:      Vascular: No carotid bruit.   Cardiovascular:      Rate and Rhythm: Normal rate and regular rhythm.      Pulses: Normal pulses.      Heart sounds: Normal heart sounds.   Pulmonary:      Effort: Pulmonary effort is normal.      Breath sounds: Normal breath sounds.   Abdominal:      General: Abdomen is flat. Bowel sounds are normal.      Palpations: Abdomen is soft.   Musculoskeletal:      Right lower leg: No edema.      Left lower leg: No edema.   Skin:     Capillary Refill: Capillary refill takes more than 3 seconds.   Neurological:      General: No focal deficit present.      Mental Status: He is oriented to person, place, and time. Mental status is at baseline.   Psychiatric:         Mood and Affect: Mood normal.         Behavior: Behavior normal.         Thought Content: Thought content normal.         Judgment: Judgment normal.         Lab Results   Component Value Date    WBC 6.9 06/17/2022    HGB 14.7 06/17/2022    HCT 43.7 06/17/2022    MCV 80 06/17/2022     06/17/2022           Problem List Items Addressed This Visit       Anxiety disorder    Asthma ([facility]-H[facility])     Stable not using the inhaler          Dyslipidemia    Relevant Orders    Lipid panel    Hypertension    Type 2 diabetes mellitus with other specified complication, without  long-term current use of insulin (Multi) - Primary     Stable          Relevant Orders    Hemoglobin A1c    Comprehensive metabolic panel    Albumin , Urine Random            A/P         SINCE CANNOT TOLERATE HIGH DOSE OF METFORMIN   DOSE REDUCED 500MG 1 PILL BID   ADDED ACTOS 15MG 1 PILL EVERY DAY   REFFERAL OPHTHALMOLOGY  LISINOPRIL 5MG 1 PILL EVERY DAY   ATORVASTATIN 10MG 1 PILL EVERY DAY   CMP,LIPID,A1C , URINE MICROALNUMIN   FOLLOW UP IN 6 MONTHS TIME

## 2024-05-11 DIAGNOSIS — E11.65 UNCONTROLLED TYPE 2 DIABETES MELLITUS WITH HYPERGLYCEMIA (MULTI): Primary | ICD-10-CM
